# Patient Record
Sex: MALE | Race: WHITE | NOT HISPANIC OR LATINO | URBAN - METROPOLITAN AREA
[De-identification: names, ages, dates, MRNs, and addresses within clinical notes are randomized per-mention and may not be internally consistent; named-entity substitution may affect disease eponyms.]

---

## 2022-10-07 ENCOUNTER — EMERGENCY (EMERGENCY)
Facility: HOSPITAL | Age: 77
LOS: 1 days | Discharge: HOME | End: 2022-10-07
Attending: EMERGENCY MEDICINE | Admitting: EMERGENCY MEDICINE
Payer: MEDICARE

## 2022-10-07 VITALS
WEIGHT: 132.06 LBS | HEART RATE: 66 BPM | RESPIRATION RATE: 147 BRPM | HEIGHT: 69 IN | TEMPERATURE: 99 F | OXYGEN SATURATION: 95 % | SYSTOLIC BLOOD PRESSURE: 161 MMHG | DIASTOLIC BLOOD PRESSURE: 81 MMHG

## 2022-10-07 PROCEDURE — 99284 EMERGENCY DEPT VISIT MOD MDM: CPT | Mod: 25

## 2022-10-07 PROCEDURE — 73130 X-RAY EXAM OF HAND: CPT

## 2022-10-07 PROCEDURE — 73130 X-RAY EXAM OF HAND: CPT | Mod: 26,RT

## 2022-10-07 PROCEDURE — 99283 EMERGENCY DEPT VISIT LOW MDM: CPT

## 2022-10-07 PROCEDURE — 11750 EXCISION NAIL&NAIL MATRIX: CPT | Mod: F5

## 2022-10-07 RX ORDER — AZTREONAM 2 G
1 VIAL (EA) INJECTION
Qty: 20 | Refills: 0
Start: 2022-10-07 | End: 2022-10-16

## 2022-10-07 RX ADMIN — Medication 1 TABLET(S): at 22:38

## 2022-10-07 NOTE — ED PROVIDER NOTE - NSFOLLOWUPINSTRUCTIONS_ED_ALL_ED_FT
Take bactrim as prescribed.  Follow up with your hand orthopedist at home.          Fingertip Infection    A normal fingertip next to a fingertip that is infected and has pus around the nail.   There are two main types of fingertip infections:  •Long-term (chronic) or acute paronychia. This is an infection that happens around your nail. This type of infection can start in one nail or occur gradually over time and affect more than one nail. The fingernails that are infected may become thick and deformed. This condition can also happen suddenly (be acute).      •Felon. This is a bacterial infection in the tip of your finger (pad). A felon infection can cause a painful collection of pus (an abscess) to form inside your fingertip. If the infection is not treated, the infection can spread as deep as the tendon or bone.        What are the causes?    Paronychia infection can be caused by:  •Bacteria.      •Funguses.      •A mix of both bacteria and funguses.      A felon infection is usually caused by the bacteria that are normally found on your skin. An infection can develop if the bacteria spread through your skin to the pad of tissue inside your fingertip.      What increases the risk?    You are more likely to develop a fingertip infection if:  •You have diabetes.      •You have a weak body's defense system (immune system).      •You work with your hands.      •Your hands are exposed to moisture, chemicals, or irritants for long periods of time.      •You have poor circulation.      •You bite, chew, or pick your fingernails.        What are the signs or symptoms?    Symptoms of paronychia infection may affect one or more fingernails and may include:  •Pain, swelling, and redness around the nail.      •Pus-filled pockets at the base or side of the fingernail (cuticle).      •Thick fingernails that separate from the nail bed.      •Pus that drains from the nail bed.      Symptoms of a felon usually affect just one fingertip pad and include:  •Severe, throbbing pain.      •Redness.      •Swelling.      •Warmth.      •Tenderness when the affected fingertip is touched.        How is this diagnosed?    This condition is diagnosed based on:  •Your medical history.      •A physical exam.      •Testing. If there is pus draining from the infection, it may be swabbed and sent to the lab for a culture.      •An X-ray. This may be done to see if the infection has spread to the bone.        How is this treated?    Treatment for a fingertip infection may include:  •Warm water or salt water soaks several times per day.      •Antibiotic medicine. This may be an ointment or pills.      •Steroid ointment.      •Antifungal pills.      •Drainage of pus pockets. This is done by making an incision to open the fingertip to drain pus.      •Wearing gloves to protect your nails.        Follow these instructions at home:    Medicines     •Take or apply over-the-counter and prescription medicines only as told by your health care provider.      •If you were prescribed an antibiotic medicine, take or apply it as told by your health care provider. Do not stop using the antibiotic even if you start to feel better.      Wound care   •Follow instructions from your health care provider about how to take care of your wound. Make sure you:  •Wash your hands with soap and water before and after you change your bandage (dressing). If soap and water are not available, use hand .      •Change your dressing as told by your health care provider.      •Leave stitches (sutures), skin glue, or adhesive strips in place. These skin closures may need to stay in place for 2 weeks or longer. If adhesive strip edges start to loosen and curl up, you may trim the loose edges. Do not remove adhesive strips completely unless your health care provider tells you to do that.      •Clean the infected area each day with warm water or salt water, or as told by your health care provider.  •Gently wash the infected area with mild soap and water.      •Rinse the infected area with water to remove all soap.      •Pat the infected area dry with a clean towel. Do not rub it.      •To make a salt and water mixture, completely dissolve ½–1 tsp (3–6 g) of salt in 1 cup (237 mL) of warm water.      •Check the infected area every day for more signs of infection. Watch for:  •More redness, swelling, or pain.      •More fluid or blood.      •Warmth.      •A bad smell.        Bathing     •Keep the dressing dry until your health care provider says it can be removed.      •Ask your health care provider if you may take baths, swim, shower, or use a hot tub. To help prevent spread of the infection, you may only be allowed to take sponge baths. This is rare.      • Do not let your bandage get wet. Cover it with a watertight covering when you take a bath or shower.      General instructions     •Raise (elevate) the infected area above the level of your heart while you are sitting or lying down or as told by your health care provider. This will help reduce inflammation.      • Do not scratch or pick at the infected area.      •Wear gloves as told by your health care provider.      •Keep all follow-up visits as told by your health care provider. This is important.        How is this prevented?    •Wear gloves when you work with your hands.      •Wash your hands often with antibacterial soap.      •Avoid letting your hands stay wet or irritated for long periods of time.      • Do not bite your fingernails.      • Do not suck on your fingers.      • Do not pull on your cuticles.      •Use clean scissors or nail clippers to trim your nails. Do not cut your fingernails very short.        Contact a health care provider if:    •Your pain medicine is not helping.      •You have more redness, swelling, or pain at your fingertip.      •You continue to have fluid, blood, or pus coming from your fingertip.      •Your infection area feels warm to the touch.      •You continue to notice a bad smell coming from your fingertip or your dressing.        Get help right away if:    •The area of redness is spreading, or you notice a red streak going away from your fingertip.      •You have a fever.        Summary    •Paronychia is an infection that happens around your nail. Paronychia infection can be caused by bacteria, funguses, or a mix of both.      •A felon infection is usually caused by the bacteria that are normally found on your skin. An infection can develop if the bacteria spread through your skin to the pad of tissue inside your fingertip.      •Follow instructions from your health care provider about how to take care of the infection.      •Take or apply over-the-counter and prescription medicines only as told by your health care provider.      •Contact a health care provider if you have more drainage, redness, swelling, or pain at your fingertip.      This information is not intended to replace advice given to you by your health care provider. Make sure you discuss any questions you have with your health care provider.      Document Revised: 03/29/2022 Document Reviewed: 03/29/2022    Elsevier Patient Education © 2022 [employer]

## 2022-10-07 NOTE — ED PROVIDER NOTE - CLINICAL SUMMARY MEDICAL DECISION MAKING FREE TEXT BOX
patient is a 76 y/o healthy male presenting for worsening right thumb pain x 24hrs  per patient, initially noted mild swelling yesterday. patient states finger was "lanced" at local , and he was placed on clindamycin.   patient states that he initially noted the discharge to be blood in nature  today, patient awoke noted increased pain, swelling, erythema, and "tightness" noted to the R thumb   patient notes no f/c/n/v  no cp/sob  no pain streaking from thumb up hand/wrist  no hx of similar.    concern for destin.   ortho consulted.

## 2022-10-07 NOTE — ED PROVIDER NOTE - NS ED ROS FT
CONSTITUTIONAL:  No weight loss, fever, chills, weakness or fatigue.  HEENT:  Eyes:  No visual loss, blurred vision, double vision or yellow sclerae. Ears, Nose, Throat:  No hearing loss, sneezing, congestion, runny nose or sore throat.  CARDIOVASCULAR:  No chest pain, chest pressure or chest discomfort. No palpitations.  RESPIRATORY:  No shortness of breath, cough or sputum.  GASTROINTESTINAL:  No anorexia, nausea, vomiting or diarrhea. No abdominal pain or blood.  GENITOURINARY:  Denies hematuria, dysuria.   NEUROLOGICAL:  No headache, dizziness, syncope, paralysis, ataxia, numbness or tingling in the extremities. No change in bowel or bladder control.  MUSCULOSKELETAL:  No muscle, back pain, joint pain or stiffness.  HEMATOLOGIC:  No anemia, bleeding or bruising.  LYMPHATICS:  No enlarged nodes.   PSYCHIATRIC:  No history of depression or anxiety.  ENDOCRINOLOGIC:  No reports of sweating, cold or heat intolerance. No polyuria or polydipsia.  ALLERGIES:  No history of asthma, hives, eczema or rhinitis. CONSTITUTIONAL:  No weight loss, fever, chills, weakness or fatigue.  HEENT:  Eyes:  No visual loss, blurred vision, double vision or yellow sclerae. Ears, Nose, Throat:  No hearing loss, sneezing, congestion, runny nose or sore throat.  CARDIOVASCULAR:  No chest pain, chest pressure or chest discomfort. No palpitations.  RESPIRATORY:  No shortness of breath, cough or sputum.  GASTROINTESTINAL:  No anorexia, nausea, vomiting or diarrhea.   NEUROLOGICAL:  No headache, dizziness, syncope, paralysis, ataxia, numbness or tingling in the extremities.   MUSCULOSKELETAL:  pain, swelling, erythema to R thumb   HEMATOLOGIC:  No anemia, bleeding or bruising.  LYMPHATICS:  No enlarged nodes.   PSYCHIATRIC:  No history of depression or anxiety.  ENDOCRINOLOGIC:  No reports of sweating, cold or heat intolerance. No polyuria or polydipsia.  ALLERGIES:  No history of asthma, hives, eczema or rhinitis.

## 2022-10-07 NOTE — ED PROVIDER NOTE - PHYSICAL EXAMINATION
CONSTITUTIONAL: Well-appearing;  in no apparent distress.   HEAD: Normocephalic; atraumatic.   EYES: PERRL; EOM intact; conjunctiva and sclera clear  ENT: normal nose; no rhinorrhea; normal pharynx with no erythema or lesions.   NECK: Supple; non-tender; no LAD  CARDIOVASCULAR: Normal S1, S2; No audible murmurs. Regular rate and rhythm.   RESPIRATORY: Breathing easily; breath sounds clear and equal bilaterally; no wheezes, rhonchi, or rales.  GI: Soft; non-distended; non-tender; no palpable organomegaly.   MSK: FROM at all extremities, normal tone   EXT: No cyanosis or edema; N/V intact  SKIN: Normal for age and race; warm; dry; good turgor; no apparent lesions or rash.   NEURO: A & O x 3; face symmetric; grossly unremarkable.   PSYCHOLOGICAL: The patient’s mood and manner are appropriate. CONSTITUTIONAL: Well-appearing;  in no apparent distress.   HEAD: Normocephalic; atraumatic.   EYES: PERRL; EOM intact; conjunctiva and sclera clear  ENT: normal nose; no rhinorrhea  CARDIOVASCULAR: Normal S1, S2; No audible murmurs. Regular rate and rhythm.   RESPIRATORY: Breathing easily; breath sounds clear and equal bilaterally; no wheezes, rhonchi, or rales.  MSK: FROM at all extremities, normal tone; notable erythema to the base of the nail bed w/ tense skin to the anterior aspect of the R thumb with exquiste ttp.   EXT: No cyanosis or edema; N/V intact  SKIN: see MSK  NEURO: A & O x 3; face symmetric; grossly unremarkable.   PSYCHOLOGICAL: The patient’s mood and manner are appropriate.

## 2022-10-07 NOTE — ED ADULT TRIAGE NOTE - CHIEF COMPLAINT QUOTE
pt sent to ED from  c/o right Thumb pain x 1 week, worsening in the past 3 days. as per note " pt had a right thumb Felon progressed to paronychia and was I+d, taking clindamycin. Mohs Histo Method Verbiage: Each section was then chromacoded and processed in the Mohs lab using the Mohs protocol and submitted for frozen section.

## 2022-10-07 NOTE — ED PROVIDER NOTE - PATIENT PORTAL LINK FT
You can access the FollowMyHealth Patient Portal offered by [facility] by registering at the following website: [email or web address]. By joining [employer]’s FollowMyHealth portal, you will also be able to view your health information using other applications (apps) compatible with our system.

## 2022-10-07 NOTE — ED ADULT NURSE NOTE - CHIEF COMPLAINT QUOTE
pt sent to ED from  c/o right Thumb pain x 1 week, worsening in the past 3 days. as per note " pt had a right thumb Felon progressed to paronychia and was I+d, taking clindamycin.

## 2022-10-07 NOTE — ED PROVIDER NOTE - OBJECTIVE STATEMENT
patient is a 76 y/o healthy male presenting for worsening right thumb pain x 24hrs  per patient, initially noted mild swelling yesterday. patient states finger was "lanced" at local , and he was placed on clindamycin.   patient states that he initially noted the discharge to be blood in nature  today, patient awoke noted increased pain, swelling, erythema, and "tightness" noted to the R thumb   patient notes no f/c/n/v  no cp/sob  no pain streaking from thumb up hand/wrist  no hx of similar.

## 2022-10-08 NOTE — CONSULT NOTE ADULT - SUBJECTIVE AND OBJECTIVE BOX
77y Male presents with RIGHT paronychia. Pt admits to shaving nails. Patient is RIGHT hand dominant.      PAST MEDICAL & SURGICAL HISTORY:    MEDICATIONS  (STANDING):    Allergies    No Known Allergies    Intolerances                      Vital Signs Last 24 Hrs  T(C): 37.3 (10-07-22 @ 19:19), Max: 37.3 (10-07-22 @ 19:19)  T(F): 99.2 (10-07-22 @ 19:19), Max: 99.2 (10-07-22 @ 19:19)  HR: 66 (10-07-22 @ 19:19) (66 - 66)  BP: 161/81 (10-07-22 @ 19:19) (161/81 - 161/81)  BP(mean): --  RR: 147 (10-07-22 @ 19:19) (147 - 147)  SpO2: 95% (10-07-22 @ 19:19) (95% - 95%)    PHYSICAL EXAM  General: NAD, Awake and Alert    RIGHT UE:   Skin intact   + Erythema and swelling at the RADIAL // ULNAR side of the nail of the 1st digit  TTP over the affected area  No palpable abscess in the pulp of the finger  NTTP over the flexor tendon sheaths  NTTP over the soft tissues of the forearm/wrist/hand/other fingers  NTTP over the bony prominences of the wrist/hand/fingers  ROM of the affected finger limited 2/2 swelling  Painless passive/active ROM of the elbow/wrist/hand/other fingers  C5-T1 SILT  Motor grossly intact throughout axillary/musculocutaneous/radial/median/ulnar/AIN/PIN nerves  + radial pulse  Compartments soft and compressible      Procedure:  Procedure explained and risks, benefits, and alternatives to procedure discussed with patient at bedside. Patient expressed full understanding and all questions were answered. Under aseptic conditions, a digital nerve block was performed with 10cc of 1% lidocaine. The RIGHT 1st digit wound was sterilely prepped and draped. A 15 blade scalpel was used to make a radial incision of the nail bed. Purulent fluid was expressed from the incision site. Blunt dissection was then used to remove the nail bed. No fluid was expressed from pulp side. The affected digit was soaked in dilute betadine solution. The wound was wrapped with saline soaked gauze and jesu. The patient tolerated the procedure well and there we no complications. The patient was neurovascularly intact following the procedure.      Assessment and Plan:  77y Male with RIGHT digit paronychia s/p bedside incision and drainage and nailbed removal    - Elevation at all times  - Pain control as needed  -Keep dressing c/d/i, change dressing 3x daily for 48 hours  -Antibiotics: Bactrim  -No acute orthopedic surgical intervention needed  -Recommend outpatient follow up in 3-5 days    Plan discussed with Dr. Moreno

## 2022-10-10 DIAGNOSIS — M79.644 PAIN IN RIGHT FINGER(S): ICD-10-CM

## 2022-10-10 DIAGNOSIS — L03.011 CELLULITIS OF RIGHT FINGER: ICD-10-CM
